# Patient Record
(demographics unavailable — no encounter records)

---

## 2025-03-07 NOTE — ASSESSMENT
[FreeTextEntry1] : -rectal bleeding - subsiding - ER notes/labs reviewed - prob due to hemorrhoids noted in ER -change in BMs - w/constipation likely due to new work situation - reviewed dietary and lifestyle modifications, including increased fluid, fiber intake, as well as habituation / following urge to defecate, cutting back on bread/pasta, and to consider starting fiber supplement (eg.,  psyllium) -hemorrhoids - cont topical steroid -colon screening - due 45, but will plan colonoscopy now to ensure no other pathology. Colonoscopy scheduled - Risks, benefits, alternatives were discussed, including but not limited to bleeding, infection, perforation and sedation risks. Additionally, the possibility of missed lesions was conveyed.  PMD/consultation/hospital notes and Labs/imaging/prior endoscopic results reviewed to extent noted in HPI; and, if procedure code billed on this visit for lab draw, this serves to signify that labs were drawn here in this office. Pt also advised that any studies ordered, if prior authorization required it may take up to a week to confirm - and if pt has not heard RE: scheduling by a week, they should call this office.

## 2025-03-07 NOTE — HISTORY OF PRESENT ILLNESS
[FreeTextEntry1] : 39m no sig PMH notes change in BMs since starting new job a month ago - unable to go during work, so holds it in. Stools harder, less freq, and on 3/5/25 had brbpr x1 - large in bowl, but stool brown. In ER Hb normal and dx w/ hemorrhoids and sent for GI f/u.  No anal pain.  Had no BM yesterday and today small BM with small BRB.  No n/v/abd pain/diarrhea.  Soc:  no tobacco or significant EtOH FHx: no FHx GI malignancy or IBD  ROS: Constitutional:: no weight loss, fevers ENT: no deafness Eyes: not blind Neck: no LN Chest: no dyspnea/cough Cardiac: no chest pain Vascular: no leg swelling GI: no abdominal pain, nausea, vomiting, diarrhea, constipation, rectal bleeding, dysphagia, melena unless otherwise noted in HPI : no dysuria, dark urine Skin: no rashes, jaundice Heme: no bleeding Endocrine: no DM unless otherwise stated in HPI  Px: (VS noted below) General: NAD Eyes: anicteric Oropharynx:  clear Neck: no LN Chest: normal respiratory effort CVS: regular Abd: soft, NT, ND, +BS, no HSM Ext: no atrophy Neuro: grossly nonfocal  Labs/imaging/prior endoscopic results reviewed to the extent available and noted in HPI

## 2025-03-07 NOTE — REASON FOR VISIT
[Consultation] : a consultation visit [FreeTextEntry1] : Kindly asked by Dr Reyes  to consult and evaluate patient for                    rectal bleeding A copy of this note is being sent to physician requesting consultation.

## 2025-03-07 NOTE — CONSULT LETTER
[FreeTextEntry1] : Dear Dr. YUAN HUDSON ,  I had the pleasure of evaluating your patient,  [place].  Please refer to my note below.  Thank you very much for allowing me to participate in the care of this patient.  If you have any questions, please do not hesitate to contact me.  Sincerely,   Denis Biggs MD